# Patient Record
Sex: MALE | Race: WHITE | NOT HISPANIC OR LATINO | Employment: FULL TIME | RURAL
[De-identification: names, ages, dates, MRNs, and addresses within clinical notes are randomized per-mention and may not be internally consistent; named-entity substitution may affect disease eponyms.]

---

## 2023-01-19 ENCOUNTER — TELEPHONE (OUTPATIENT)
Dept: FAMILY MEDICINE CLINIC | Facility: CLINIC | Age: 28
End: 2023-01-19

## 2023-01-19 NOTE — TELEPHONE ENCOUNTER
Caller: Mike Toledo    Relationship: Self    Best call back number: [phone]    Requested Prescriptions:   Requested Prescriptions      No prescriptions requested or ordered in this encounter        Pharmacy where request should be sent: [facility]/PHARMACY #[id number]  [place] [address] - [phone] [facility] [phone] FX     Additional details provided by patient: ALLOPURONOL (DID NOT KNOW MG)  PATIENT STATED THAT HE HAS NOT BEEN SEEN SINCE LAST YEAR AND WAS NEEDING MEDICATION REFILLED BUT IF HE WOULD NEED TO BE SEEN PLEASE LET HIM ASAP    Does the patient have less than a 3 day supply:  [x] Yes  [] No    Would you like a call back once the refill request has been completed: [x] Yes [] No    If the office needs to give you a call back, can they leave a voicemail: [x] Yes [] No    Akanksha French Rep   01/19/23 15:02 EST

## 2023-01-20 RX ORDER — ALLOPURINOL 300 MG/1
300 TABLET ORAL DAILY
COMMUNITY
End: 2023-01-20 | Stop reason: SDUPTHER

## 2023-01-20 RX ORDER — ALLOPURINOL 300 MG/1
300 TABLET ORAL DAILY
Qty: 30 TABLET | Refills: 0 | Status: SHIPPED | OUTPATIENT
Start: 2023-01-20 | End: 2023-01-25 | Stop reason: SDUPTHER

## 2023-01-20 NOTE — TELEPHONE ENCOUNTER
Sending one month, please schedule physical with Jesus. Also please update his demographic info, I got his address from [facility]. May not be correct.

## 2023-01-24 ENCOUNTER — OFFICE VISIT (OUTPATIENT)
Dept: FAMILY MEDICINE CLINIC | Facility: CLINIC | Age: 28
End: 2023-01-24
Payer: COMMERCIAL

## 2023-01-24 VITALS
RESPIRATION RATE: 17 BRPM | HEART RATE: 98 BPM | BODY MASS INDEX: 33.23 KG/M2 | SYSTOLIC BLOOD PRESSURE: 140 MMHG | HEIGHT: 72 IN | WEIGHT: 245.38 LBS | OXYGEN SATURATION: 98 % | DIASTOLIC BLOOD PRESSURE: 84 MMHG

## 2023-01-24 DIAGNOSIS — M1A.0790 CHRONIC IDIOPATHIC GOUT INVOLVING TOE WITHOUT TOPHUS, UNSPECIFIED LATERALITY: ICD-10-CM

## 2023-01-24 DIAGNOSIS — Z00.00 ANNUAL PHYSICAL EXAM: Primary | ICD-10-CM

## 2023-01-24 DIAGNOSIS — R04.0 EPISTAXIS: ICD-10-CM

## 2023-01-24 DIAGNOSIS — J30.89 NON-SEASONAL ALLERGIC RHINITIS DUE TO OTHER ALLERGIC TRIGGER: ICD-10-CM

## 2023-01-24 PROCEDURE — 99385 PREV VISIT NEW AGE 18-39: CPT | Performed by: PHYSICIAN ASSISTANT

## 2023-01-24 PROCEDURE — 36415 COLL VENOUS BLD VENIPUNCTURE: CPT | Performed by: PHYSICIAN ASSISTANT

## 2023-01-24 NOTE — PROGRESS NOTES
.  Chief Complaint  Med Refill and Establish Care (Re establish care and medication refills. )    Subjective          Mike Toledo presents to [facility] for   History of Present Illness  Patient was last seen in our office over a year ago.  He is here to reestablish care.  He states that he has a history of gout and was placed on allopurinol a few years ago.  He states that he has been taking allopurinol daily and has had no recurrence of gout.  He states that he been taking it daily until he ran out about a week ago.  He states that he has not had any symptoms of any joint pain or redness since has been out of his medication.  He states that he has had chronic nosebleeds since he was born.  He states that they are the type that will regularly start and drenched shirt.  He states that he was seen by an ENT as a child and was told that he was too young to have cauterization.  He did start using Flonase daily and that seems to help put off his nosebleeds.  He states that he has them not as often but they do seem more severe when he does have them.  He states that he was having a nosebleed about every 2 days but then he started the Flonase and has them at most twice a month.  He states he has never had to go to the ER for a nosebleed and they usually stop within 2 minutes.  Patient states that he is always had seasonal allergies he usually has sneezing runny nose and congestion.  He states that he has constant postnasal drainage.  He states that he has eyedrops but takes Xyzal only with symptoms.  He has not had a flu shot this year      History of gout- no issues since starting allupurinol. He has been taking daily until ran out. A week ago at most two, no symptoms since out, He lalit any other concerns he has had chronic nose beeds since her was born., he has the occ drenching his short.  He was seen by ent as kiddo. Told too young to have cauterization, he started nose spray dfily and  "helps put it off a bit so not as often but more severe. Were every two days and since on nose spray twice a month. If he can hold nose last about two minutes. He staes if at work just workd through it,  No ER visits. Taking only one gout attack      Objective   Vital Signs:   Vitals:    01/24/23 0834   BP: 140/84   BP Location: Left arm   Patient Position: Sitting   Cuff Size: Adult   Pulse: 98   Resp: 17   SpO2: 98%   Weight: 111 kg (245 lb 6 oz)   Height: 182.9 cm (72\")   PainSc: 0-No pain     Body mass index is 33.28 kg/m².    BMI is >= 30 and <35. (Class 1 Obesity). The following options were offered after discussion;: exercise counseling/recommendations and nutrition counseling/recommendations    Review of Systems   HENT: Positive for nosebleeds, postnasal drip, rhinorrhea, sinus pressure and sneezing.    Eyes: Positive for itching.   Respiratory: Negative for cough, shortness of breath and wheezing.    Gastrointestinal: Negative for blood in stool, constipation and nausea.   Genitourinary: Negative for difficulty urinating and dysuria.   Neurological: Positive for dizziness. Negative for light-headedness.           Physical Exam  Vitals and nursing note reviewed.   Constitutional:       Appearance: Normal appearance.   HENT:      Head: Normocephalic and atraumatic.      Right Ear: Tympanic membrane, ear canal and external ear normal.      Left Ear: Tympanic membrane, ear canal and external ear normal.      Nose: Congestion present.      Comments: Mucosa eryth  Eyes:      Pupils: Pupils are equal, round, and reactive to light.   Cardiovascular:      Rate and Rhythm: Normal rate and regular rhythm.      Heart sounds: Normal heart sounds.   Pulmonary:      Effort: Pulmonary effort is normal.      Breath sounds: Normal breath sounds.   Neurological:      General: No focal deficit present.      Mental Status: He is alert.   Psychiatric:         Mood and Affect: Mood normal.        Result Review :            "     Immunization History   Administered Date(s) Administered   • COVID-19 (PFIZER) PURPLE CAP 04/07/2021, 04/30/2021, 01/13/2022   • DTaP, Unspecified 08/24/2001   • Hep B, Adolescent or Pediatric 08/24/2001   • IPV 08/24/2001   • Influenza, Unspecified 10/16/2020   • MMR 08/24/2001   • Varicella 08/24/2001       Health Maintenance   Topic Date Due   • INFLUENZA VACCINE  03/31/2023 (Originally 8/1/2022)   • TDAP/TD VACCINES (1 - Tdap) 01/24/2024 (Originally 4/11/2016)            Assessment and Plan    Diagnoses and all orders for this visit:    1. Annual physical exam (Primary)   Labs today  -     CBC w AUTO Differential  -     Comprehensive metabolic panel  -     Lipid panel    2. Epistaxis  -     Ambulatory Referral to ENT (Otolaryngology)  Will refer to ENT and in the meantime to continue Flonase daily  3. Non-seasonal allergic rhinitis due to other allergic trigger  Ask that he start taking his antihistamines daily  4. Chronic idiopathic gout involving toe without tophus, unspecified laterality  Uric acid level for today and will restart his allopurinol as well.  -     Uric acid  -     allopurinol (ZYLOPRIM) 300 MG tablet; Take 1 tablet by mouth Daily.  Dispense: 90 tablet; Refill: 1        Counseling/anticipatory guidance: Nutrition, physical activity, healthy weight, dental health, mental health, eye exam, immunizations, screenings      Follow Up   Return in about 6 months (around 7/24/2023).  Patient was given instructions and counseling regarding his condition or for health maintenance advice. Please see specific information pulled into the AVS if appropriate.

## 2023-01-25 LAB
ALBUMIN SERPL-MCNC: 5.1 G/DL (ref 4.1–5.2)
ALBUMIN/GLOB SERPL: 2.4 {RATIO} (ref 1.2–2.2)
ALP SERPL-CCNC: 55 IU/L (ref 44–121)
ALT SERPL-CCNC: 43 IU/L (ref 0–44)
AST SERPL-CCNC: 27 IU/L (ref 0–40)
BASOPHILS # BLD AUTO: 0 X10E3/UL (ref 0–0.2)
BASOPHILS NFR BLD AUTO: 1 %
BILIRUB SERPL-MCNC: 0.5 MG/DL (ref 0–1.2)
BUN SERPL-MCNC: 8 MG/DL (ref 6–20)
BUN/CREAT SERPL: 9 (ref 9–20)
CALCIUM SERPL-MCNC: 9.7 MG/DL (ref 8.7–10.2)
CHLORIDE SERPL-SCNC: 104 MMOL/L (ref 96–106)
CHOLEST SERPL-MCNC: 166 MG/DL (ref 100–199)
CO2 SERPL-SCNC: 24 MMOL/L (ref 20–29)
CREAT SERPL-MCNC: 0.92 MG/DL (ref 0.76–1.27)
EGFRCR SERPLBLD CKD-EPI 2021: 118 ML/MIN/1.73
EOSINOPHIL # BLD AUTO: 0.2 X10E3/UL (ref 0–0.4)
EOSINOPHIL NFR BLD AUTO: 3 %
ERYTHROCYTE [DISTWIDTH] IN BLOOD BY AUTOMATED COUNT: 11.9 % (ref 11.6–15.4)
GLOBULIN SER CALC-MCNC: 2.1 G/DL (ref 1.5–4.5)
GLUCOSE SERPL-MCNC: 87 MG/DL (ref 70–99)
HCT VFR BLD AUTO: 49.4 % (ref 37.5–51)
HDLC SERPL-MCNC: 53 MG/DL
HGB BLD-MCNC: 16.6 G/DL (ref 13–17.7)
IMM GRANULOCYTES # BLD AUTO: 0 X10E3/UL (ref 0–0.1)
IMM GRANULOCYTES NFR BLD AUTO: 0 %
LDLC SERPL CALC-MCNC: 103 MG/DL (ref 0–99)
LYMPHOCYTES # BLD AUTO: 1.9 X10E3/UL (ref 0.7–3.1)
LYMPHOCYTES NFR BLD AUTO: 33 %
MCH RBC QN AUTO: 29.6 PG (ref 26.6–33)
MCHC RBC AUTO-ENTMCNC: 33.6 G/DL (ref 31.5–35.7)
MCV RBC AUTO: 88 FL (ref 79–97)
MONOCYTES # BLD AUTO: 0.5 X10E3/UL (ref 0.1–0.9)
MONOCYTES NFR BLD AUTO: 9 %
NEUTROPHILS # BLD AUTO: 3.1 X10E3/UL (ref 1.4–7)
NEUTROPHILS NFR BLD AUTO: 54 %
PLATELET # BLD AUTO: 308 X10E3/UL (ref 150–450)
POTASSIUM SERPL-SCNC: 5 MMOL/L (ref 3.5–5.2)
PROT SERPL-MCNC: 7.2 G/DL (ref 6–8.5)
RBC # BLD AUTO: 5.6 X10E6/UL (ref 4.14–5.8)
SODIUM SERPL-SCNC: 141 MMOL/L (ref 134–144)
TRIGL SERPL-MCNC: 51 MG/DL (ref 0–149)
URATE SERPL-MCNC: 7.7 MG/DL (ref 3.8–8.4)
VLDLC SERPL CALC-MCNC: 10 MG/DL (ref 5–40)
WBC # BLD AUTO: 5.7 X10E3/UL (ref 3.4–10.8)

## 2023-01-25 RX ORDER — ALLOPURINOL 300 MG/1
300 TABLET ORAL DAILY
Qty: 90 TABLET | Refills: 1 | Status: SHIPPED | OUTPATIENT
Start: 2023-01-25

## 2023-01-25 NOTE — PROGRESS NOTES
Please call patient and let him know that his uric acid level is on the high end of normal but good- will recheck at his next visit. His cholesterol, blood count and electrolytes all look good

## 2023-07-28 DIAGNOSIS — M1A.0790 CHRONIC IDIOPATHIC GOUT INVOLVING TOE WITHOUT TOPHUS, UNSPECIFIED LATERALITY: ICD-10-CM

## 2023-07-28 RX ORDER — ALLOPURINOL 300 MG/1
TABLET ORAL
Qty: 90 TABLET | Refills: 1 | Status: SHIPPED | OUTPATIENT
Start: 2023-07-28

## 2024-02-15 DIAGNOSIS — M1A.0790 CHRONIC IDIOPATHIC GOUT INVOLVING TOE WITHOUT TOPHUS, UNSPECIFIED LATERALITY: ICD-10-CM

## 2024-02-15 RX ORDER — ALLOPURINOL 300 MG/1
TABLET ORAL
Qty: 30 TABLET | Refills: 0 | Status: SHIPPED | OUTPATIENT
Start: 2024-02-15

## 2024-02-27 ENCOUNTER — OFFICE VISIT (OUTPATIENT)
Dept: FAMILY MEDICINE CLINIC | Facility: CLINIC | Age: 29
End: 2024-02-27
Payer: COMMERCIAL

## 2024-02-27 VITALS
HEART RATE: 92 BPM | SYSTOLIC BLOOD PRESSURE: 124 MMHG | WEIGHT: 225 LBS | OXYGEN SATURATION: 98 % | BODY MASS INDEX: 30.48 KG/M2 | DIASTOLIC BLOOD PRESSURE: 80 MMHG | HEIGHT: 72 IN

## 2024-02-27 DIAGNOSIS — R07.89 COSTOCHONDRAL CHEST PAIN: ICD-10-CM

## 2024-02-27 DIAGNOSIS — M1A.0790 CHRONIC IDIOPATHIC GOUT INVOLVING TOE WITHOUT TOPHUS, UNSPECIFIED LATERALITY: ICD-10-CM

## 2024-02-27 DIAGNOSIS — Z00.00 ANNUAL PHYSICAL EXAM: Primary | ICD-10-CM

## 2024-02-27 RX ORDER — ALLOPURINOL 300 MG/1
300 TABLET ORAL DAILY
Qty: 90 TABLET | Refills: 0 | Status: SHIPPED | OUTPATIENT
Start: 2024-02-27

## 2024-02-27 NOTE — PROGRESS NOTES
"Chief Complaint  Annual Exam and Gout (Med recheck )    Subjective          Mike Toledo presents to [facility] for   History of Present Illness  Patient presents to clinic for his annual exam.  He states that with increased activity he has found he is lost about 20 pounds since last time he checked it which was about a year ago.  Patient states that he has continued to take his allopurinol until he ran out a few days ago.  He states he has not had any gout attacks at all since his last visit.  He states that before he was on the allopurinol he had them once in a blue moon but certainly more regularly.  He states that every once in a while he will feel a pop in the middle of his chest which she notices gets worse when he is doing a lot of physical exertion at work.  He denies any trauma to the area or falls.  He states that happens very rarely.    Objective   Vital Signs:   Vitals:    02/27/24 0907   BP: 124/80   Pulse: 92   SpO2: 98%   Weight: 102 kg (225 lb)   Height: 182.9 cm (72\")     Body mass index is 30.52 kg/m².  BMI is >= 30 and <35. (Class 1 Obesity). The following options were offered after discussion;: information on healthy weight added to patient's after visit summary       Family History   Problem Relation Age of Onset    Arthritis Mother     Asthma Mother     Cancer Father     Hyperlipidemia Father     Heart disease Paternal Grandfather     Arthritis Sister        Social History     Tobacco Use    Smoking status: Never    Smokeless tobacco: Never    Tobacco comments:     Tried it in high school. Didnt smoke more than a pack ever   Substance Use Topics    Alcohol use: Yes     Alcohol/week: 2.0 standard drinks of alcohol     Types: 2 Cans of beer per week       Past Surgical History:   Procedure Laterality Date    TONSILLECTOMY         Review of Systems      Physical Exam  Vitals and nursing note reviewed.   Constitutional:       General: He is not in acute distress.     " Appearance: Normal appearance. He is not ill-appearing, toxic-appearing or diaphoretic.   HENT:      Head: Normocephalic and atraumatic.      Right Ear: Tympanic membrane, ear canal and external ear normal.      Left Ear: Tympanic membrane, ear canal and external ear normal.      Nose: Nose normal.      Mouth/Throat:      Mouth: Mucous membranes are moist.   Eyes:      Pupils: Pupils are equal, round, and reactive to light.   Cardiovascular:      Rate and Rhythm: Normal rate and regular rhythm.      Heart sounds: Normal heart sounds.   Pulmonary:      Effort: Pulmonary effort is normal.      Breath sounds: Normal breath sounds.   Chest:       Musculoskeletal:         General: Normal range of motion.   Neurological:      General: No focal deficit present.      Mental Status: He is alert.   Psychiatric:         Mood and Affect: Mood normal.         Behavior: Behavior normal.      Result Review :                Immunization History   Administered Date(s) Administered    COVID-19 (PFIZER) Purple Cap Monovalent 04/07/2021, 04/30/2021, 01/13/2022    DTaP, Unspecified 08/24/2001    Fluzone (or Fluarix & Flulaval for VFC) >6mos 10/16/2020    Hep B, Adolescent or Pediatric 08/24/2001    IPV 08/24/2001    Influenza, Unspecified 10/16/2020    MMR 08/24/2001    Varicella 08/24/2001       Health Maintenance   Topic Date Due    TDAP/TD VACCINES (1 - Tdap) Never done    INFLUENZA VACCINE  03/31/2024 (Originally 8/1/2023)    COVID-19 Vaccine (4 - 2023-24 season) 12/01/2024 (Originally 9/1/2023)    ANNUAL PHYSICAL  02/27/2025    BMI FOLLOWUP  02/27/2025    HEPATITIS C SCREENING  Completed    Pneumococcal Vaccine 0-64  Aged Out              Assessment and Plan    Diagnoses and all orders for this visit:    1. Annual physical exam (Primary)  -     CBC Auto Differential  -     Comprehensive Metabolic Panel  -     Lipid Panel  -     Hepatitis C antibody  -     Uric acid    2. Chronic idiopathic gout involving toe without tophus,  "unspecified laterality  -     CBC Auto Differential  -     Comprehensive Metabolic Panel  -     Lipid Panel  -     Hepatitis C antibody  -     Uric acid  -     allopurinol (ZYLOPRIM) 300 MG tablet; Take 1 tablet by mouth Daily.  Dispense: 90 tablet; Refill: 0  Labs and refill medication  3. Costochondral chest pain  Reassurance to patient that he is feeling the \"joint\" between fib and breastbone move back and forth- If it becomes painful or occure more often he is to let us know        Counseling/anticipatory guidance: Nutrition, physical activity, healthy weight, dental health, mental health, eye exam, immunizations, screenings      Follow Up   No follow-ups on file.  Patient was given instructions and counseling regarding his condition or for health maintenance advice. Please see specific information pulled into the AVS if appropriate.        "

## 2024-02-28 LAB
ALBUMIN SERPL-MCNC: 5 G/DL (ref 4.3–5.2)
ALBUMIN/GLOB SERPL: 2.3 {RATIO} (ref 1.2–2.2)
ALP SERPL-CCNC: 61 IU/L (ref 44–121)
ALT SERPL-CCNC: 32 IU/L (ref 0–44)
AST SERPL-CCNC: 23 IU/L (ref 0–40)
BASOPHILS # BLD AUTO: 0 X10E3/UL (ref 0–0.2)
BASOPHILS NFR BLD AUTO: 1 %
BILIRUB SERPL-MCNC: 1.1 MG/DL (ref 0–1.2)
BUN SERPL-MCNC: 11 MG/DL (ref 6–20)
BUN/CREAT SERPL: 12 (ref 9–20)
CALCIUM SERPL-MCNC: 9.9 MG/DL (ref 8.7–10.2)
CHLORIDE SERPL-SCNC: 103 MMOL/L (ref 96–106)
CHOLEST SERPL-MCNC: 171 MG/DL (ref 100–199)
CO2 SERPL-SCNC: 26 MMOL/L (ref 20–29)
CREAT SERPL-MCNC: 0.89 MG/DL (ref 0.76–1.27)
EGFRCR SERPLBLD CKD-EPI 2021: 121 ML/MIN/1.73
EOSINOPHIL # BLD AUTO: 0.1 X10E3/UL (ref 0–0.4)
EOSINOPHIL NFR BLD AUTO: 3 %
ERYTHROCYTE [DISTWIDTH] IN BLOOD BY AUTOMATED COUNT: 12 % (ref 11.6–15.4)
GLOBULIN SER CALC-MCNC: 2.2 G/DL (ref 1.5–4.5)
GLUCOSE SERPL-MCNC: 92 MG/DL (ref 70–99)
HCT VFR BLD AUTO: 49.6 % (ref 37.5–51)
HCV IGG SERPL QL IA: NON REACTIVE
HDLC SERPL-MCNC: 48 MG/DL
HGB BLD-MCNC: 16.3 G/DL (ref 13–17.7)
IMM GRANULOCYTES # BLD AUTO: 0 X10E3/UL (ref 0–0.1)
IMM GRANULOCYTES NFR BLD AUTO: 0 %
LDLC SERPL CALC-MCNC: 107 MG/DL (ref 0–99)
LYMPHOCYTES # BLD AUTO: 1.8 X10E3/UL (ref 0.7–3.1)
LYMPHOCYTES NFR BLD AUTO: 35 %
MCH RBC QN AUTO: 29.2 PG (ref 26.6–33)
MCHC RBC AUTO-ENTMCNC: 32.9 G/DL (ref 31.5–35.7)
MCV RBC AUTO: 89 FL (ref 79–97)
MONOCYTES # BLD AUTO: 0.4 X10E3/UL (ref 0.1–0.9)
MONOCYTES NFR BLD AUTO: 7 %
NEUTROPHILS # BLD AUTO: 2.9 X10E3/UL (ref 1.4–7)
NEUTROPHILS NFR BLD AUTO: 54 %
PLATELET # BLD AUTO: 290 X10E3/UL (ref 150–450)
POTASSIUM SERPL-SCNC: 4.6 MMOL/L (ref 3.5–5.2)
PROT SERPL-MCNC: 7.2 G/DL (ref 6–8.5)
RBC # BLD AUTO: 5.59 X10E6/UL (ref 4.14–5.8)
SODIUM SERPL-SCNC: 141 MMOL/L (ref 134–144)
TRIGL SERPL-MCNC: 85 MG/DL (ref 0–149)
URATE SERPL-MCNC: 6.3 MG/DL (ref 3.8–8.4)
VLDLC SERPL CALC-MCNC: 16 MG/DL (ref 5–40)
WBC # BLD AUTO: 5.3 X10E3/UL (ref 3.4–10.8)

## 2024-02-28 NOTE — PROGRESS NOTES
Please call the patient and let him know that his labs look good. His kidney, liver and electrolytes are normal. His total cholesterol is 171 which is good and his LDL cholesterol is slightly elevated at 107. His uric acid levels are at 6.3 as compared with 7 last year and also good! He does not have Hep C and his blood count is normal as well

## 2024-04-10 ENCOUNTER — OFFICE VISIT (OUTPATIENT)
Dept: FAMILY MEDICINE CLINIC | Facility: CLINIC | Age: 29
End: 2024-04-10
Payer: COMMERCIAL

## 2024-04-10 VITALS
BODY MASS INDEX: 31.13 KG/M2 | DIASTOLIC BLOOD PRESSURE: 86 MMHG | HEIGHT: 72 IN | WEIGHT: 229.8 LBS | HEART RATE: 80 BPM | OXYGEN SATURATION: 97 % | SYSTOLIC BLOOD PRESSURE: 122 MMHG

## 2024-04-10 DIAGNOSIS — M79.18 RIGHT BUTTOCK PAIN: ICD-10-CM
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
## 2024-04-10 NOTE — ASSESSMENT & PLAN NOTE
"Has had ongoing pain since the fall. Reports overall has improved, but still with severe pain if he \"slouches\" in a chair or lays on a hard surface with pressure on the right buttock/hip.     Taking no medications for pain relief outside of tylenol on the day of injury. Reports dark purple bruising for a few weeks after fall, has significantly improved but does still have faint bruising on right buttock with hard knot on upper buttock/low back/hip area    We discussed symptomatic measures. Will check xray to confirm if there are any fractures - will follow for results  "

## 2024-04-10 NOTE — PROGRESS NOTES
"    Office Note     Name: Mike Toledo    : 1995     MRN: [id number]     Chief Complaint  Fall (Pt had fell down stairs a month and a half ago and his back/Buttocks have been hurting since ) and Rectal Pain (Pt has a sharp pain along his right hand side, Pt states it hurts the most when sitting in a certain position. Its further up his back )    Subjective     History of Present Illness:  Mike Toledo is a 28 y.o. male who presents today for persistent pain in his right hip/low back/buttock after a fall down the stairs 6 weeks ago.       Objective     Past Medical History:   Diagnosis Date    Allergic 1995    Allergic to outside and dust     Past Surgical History:   Procedure Laterality Date    TONSILLECTOMY       Family History   Problem Relation Age of Onset    Arthritis Mother     Asthma Mother     Cancer Father     Hyperlipidemia Father     Heart disease Paternal Grandfather     Arthritis Sister        Vital Signs  /86 (BP Location: Left arm, Patient Position: Sitting, Cuff Size: Adult)   Pulse 80   Ht 182.9 cm (72\")   Wt 104 kg (229 lb 12.8 oz)   SpO2 97%   BMI 31.17 kg/m²   Estimated body mass index is 31.17 kg/m² as calculated from the following:    Height as of this encounter: 182.9 cm (72\").    Weight as of this encounter: 104 kg (229 lb 12.8 oz).    Physical Exam  Vitals reviewed.   Constitutional:       Appearance: Normal appearance.   Cardiovascular:      Rate and Rhythm: Normal rate and regular rhythm.      Heart sounds: Normal heart sounds.   Pulmonary:      Effort: Pulmonary effort is normal.      Breath sounds: Normal breath sounds.   Musculoskeletal:      Right hip: Normal. Tenderness present.        Legs:    Skin:     General: Skin is warm and dry.      Capillary Refill: Capillary refill takes less than 2 seconds.   Neurological:      General: No focal deficit present.      Mental Status: He is alert and oriented to person, place, and time.   Psychiatric:         Mood and " "Affect: Mood normal.         Behavior: Behavior normal.               Assessment and Plan     Diagnoses and all orders for this visit:    1. Fall down stairs, initial encounter (Primary)  Assessment & Plan:  Fell down stairs at home about 6 weeks ago, was after his last visit in-office. Slipped, wearing flip flops, landed on his R buttock and bounced down the last 2 steps. Reports immediate pain, became nauseous and broke out in a sweat from the pain but not vomit.     Was not and has not been seen at [facility] or ER for this injury    Orders:  -     Cancel: XR Spine Lumbar 2 or 3 View (In Office)  -     XR Sacrum & Coccyx    2. Right buttock pain  Assessment & Plan:  Has had ongoing pain since the fall. Reports overall has improved, but still with severe pain if he \"slouches\" in a chair or lays on a hard surface with pressure on the right buttock/hip.     Taking no medications for pain relief outside of tylenol on the day of injury. Reports dark purple bruising for a few weeks after fall, has significantly improved but does still have faint bruising on right buttock with hard knot on upper buttock/low back/hip area    We discussed symptomatic measures. Will check xray to confirm if there are any fractures - will follow for results    Orders:  -     Cancel: XR Spine Lumbar 2 or 3 View (In Office)  -     XR Sacrum & Coccyx       Follow Up  Return if symptoms worsen or fail to improve.    MYRA Nguyen  "

## 2024-04-10 NOTE — ASSESSMENT & PLAN NOTE
Fell down stairs at home about 6 weeks ago, was after his last visit in-office. Slipped, wearing flip flops, landed on his R buttock and bounced down the last 2 steps. Reports immediate pain, became nauseous and broke out in a sweat from the pain but not vomit.     Was not and has not been seen at [facility] or ER for this injury

## 2024-05-27 DIAGNOSIS — M1A.0790 CHRONIC IDIOPATHIC GOUT INVOLVING TOE WITHOUT TOPHUS, UNSPECIFIED LATERALITY: ICD-10-CM

## 2024-05-28 RX ORDER — ALLOPURINOL 300 MG/1
300 TABLET ORAL DAILY
Qty: 90 TABLET | Refills: 0 | Status: SHIPPED | OUTPATIENT
Start: 2024-05-28

## 2024-09-04 DIAGNOSIS — M1A.0790 CHRONIC IDIOPATHIC GOUT INVOLVING TOE WITHOUT TOPHUS, UNSPECIFIED LATERALITY: ICD-10-CM

## 2024-09-04 RX ORDER — ALLOPURINOL 300 MG/1
300 TABLET ORAL DAILY
Qty: 90 TABLET | Refills: 0 | Status: SHIPPED | OUTPATIENT
Start: 2024-09-04

## 2024-12-12 DIAGNOSIS — M1A.0790 CHRONIC IDIOPATHIC GOUT INVOLVING TOE WITHOUT TOPHUS, UNSPECIFIED LATERALITY: ICD-10-CM

## 2024-12-12 RX ORDER — ALLOPURINOL 300 MG/1
300 TABLET ORAL DAILY
Qty: 90 TABLET | Refills: 0 | Status: SHIPPED | OUTPATIENT
Start: 2024-12-12

## 2025-03-10 DIAGNOSIS — M1A.0790 CHRONIC IDIOPATHIC GOUT INVOLVING TOE WITHOUT TOPHUS, UNSPECIFIED LATERALITY: ICD-10-CM

## 2025-03-10 RX ORDER — ALLOPURINOL 300 MG/1
300 TABLET ORAL DAILY
Qty: 30 TABLET | Refills: 0 | Status: SHIPPED | OUTPATIENT
Start: 2025-03-10

## 2025-06-23 ENCOUNTER — OFFICE VISIT (OUTPATIENT)
Dept: FAMILY MEDICINE CLINIC | Facility: CLINIC | Age: 30
End: 2025-06-23
Payer: COMMERCIAL

## 2025-06-23 VITALS
HEIGHT: 72 IN | WEIGHT: 241 LBS | OXYGEN SATURATION: 97 % | DIASTOLIC BLOOD PRESSURE: 74 MMHG | BODY MASS INDEX: 32.64 KG/M2 | HEART RATE: 98 BPM | SYSTOLIC BLOOD PRESSURE: 116 MMHG

## 2025-06-23 DIAGNOSIS — Z13.21 ENCOUNTER FOR VITAMIN DEFICIENCY SCREENING: ICD-10-CM

## 2025-06-23 DIAGNOSIS — Z13.220 SCREENING FOR LIPID DISORDERS: ICD-10-CM

## 2025-06-23 DIAGNOSIS — Z00.00 ANNUAL PHYSICAL EXAM: Primary | ICD-10-CM

## 2025-06-23 DIAGNOSIS — M25.541 ARTHRALGIA OF BOTH HANDS: ICD-10-CM

## 2025-06-23 DIAGNOSIS — M1A.0790 CHRONIC IDIOPATHIC GOUT INVOLVING TOE WITHOUT TOPHUS, UNSPECIFIED LATERALITY: ICD-10-CM

## 2025-06-23 DIAGNOSIS — M25.542 ARTHRALGIA OF BOTH HANDS: ICD-10-CM

## 2025-06-23 DIAGNOSIS — J30.1 SEASONAL ALLERGIC RHINITIS DUE TO POLLEN: ICD-10-CM

## 2025-06-23 DIAGNOSIS — Z13.29 SCREENING FOR THYROID DISORDER: ICD-10-CM

## 2025-06-23 PROCEDURE — 99214 OFFICE O/P EST MOD 30 MIN: CPT | Performed by: PHYSICIAN ASSISTANT

## 2025-06-23 PROCEDURE — 99395 PREV VISIT EST AGE 18-39: CPT | Performed by: PHYSICIAN ASSISTANT

## 2025-06-23 RX ORDER — AZELASTINE 1 MG/ML
2 SPRAY, METERED NASAL 2 TIMES DAILY
Qty: 30 ML | Refills: 3 | Status: SHIPPED | OUTPATIENT
Start: 2025-06-23

## 2025-06-23 RX ORDER — ALLOPURINOL 300 MG/1
300 TABLET ORAL DAILY
Qty: 90 TABLET | Refills: 1 | Status: SHIPPED | OUTPATIENT
Start: 2025-06-23

## 2025-06-23 NOTE — PROGRESS NOTES
Chief Complaint  Gout (Med recheck )    Subjective          Mike Toledo presents to [facility] for   History of Present Illness  .  History of Present Illness  The patient is here today to recheck his gout.    He reports that his gout has been well-managed with allopurinol, and he has not experienced any recent flare-ups. However, he has been off the medication for the past 3 weeks due to forgetfulness in scheduling an appointment. He is seeking a refill of his allopurinol prescription.    He also reports intermittent bilateral hand discomfort, which varies in intensity from day to day. 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 Despite the discomfort, he retains his strength but notes an unusual sensation in his hands. The pain is predominantly localized to his knuckles. He occasionally experiences numbness and tingling in his hands, and wrist pain that lasted for 4 days about a week ago. He also reports difficulty moving his hands upon waking up on some mornings. He has been managing the pain with acetaminophen 400 mg, which provides minimal relief. He has a history of a broken wrist from his youth.    He has been experiencing seasonal allergies, which have necessitated nasal cauterization on several occasions due to severe nosebleeds. He took an allergy medication last night, which provided significant relief. He reports no history of asthma. His allergy symptoms include a runny nose, coughing up mucus, sniffling, and an itchy nose and throat. He has previously tried Benadryl and Xyzal, both of which initially provided relief but eventually became ineffective. He underwent allergy testing approximately 20 years ago and received allergy injections for several years, which also lost their effectiveness over time.    FAMILY HISTORY  His mother and sister have rheumatoid  "arthritis.      Objective   Vital Signs:   Vitals:    06/23/25 1302   BP: 116/74   Pulse: 98   SpO2: 97%   Weight: 109 kg (241 lb)   Height: 182.9 cm (72\")     Body mass index is 32.69 kg/m².        Family History   Problem Relation Age of Onset   • Arthritis Mother    • Asthma Mother    • Cancer Father    • Hyperlipidemia Father    • Heart disease Paternal Grandfather    • Arthritis Sister        Social History     Tobacco Use   • Smoking status: Never   • Smokeless tobacco: Never   • Tobacco comments:     Tried it in high school. Didnt smoke more than a pack ever   Substance Use Topics   • Alcohol use: Yes     Alcohol/week: 2.0 standard drinks of alcohol     Types: 2 Cans of beer per week       Past Surgical History:   Procedure Laterality Date   • TONSILLECTOMY         Review of Systems      Physical Exam  Vitals and nursing note reviewed.   Constitutional:       General: He is not in acute distress.     Appearance: Normal appearance. He is not ill-appearing, toxic-appearing or diaphoretic.   HENT:      Head: Normocephalic and atraumatic.      Right Ear: Tympanic membrane, ear canal and external ear normal.      Left Ear: Tympanic membrane, ear canal and external ear normal.      Nose: Congestion present.      Mouth/Throat:      Mouth: Mucous membranes are moist.   Eyes:      Pupils: Pupils are equal, round, and reactive to light.   Cardiovascular:      Rate and Rhythm: Normal rate and regular rhythm.   Pulmonary:      Effort: Pulmonary effort is normal.      Breath sounds: Normal breath sounds.   Neurological:      General: No focal deficit present.      Mental Status: He is alert.   Psychiatric:         Mood and Affect: Mood normal.         Behavior: Behavior normal.      Result Review :                Immunization History   Administered Date(s) Administered   • COVID-19 (PFIZER) Purple Cap Monovalent 04/07/2021, 04/30/2021, 01/13/2022   • DTaP, Unspecified 08/24/2001   • Fluzone (or Fluarix & Flulaval for VFC) " >6mos 10/16/2020   • Hep B, Adolescent or Pediatric 08/24/2001   • IPV 08/24/2001   • Influenza, Unspecified 10/16/2020   • MMR 08/24/2001   • Varicella 08/24/2001       Health Maintenance   Topic Date Due   • TDAP/TD VACCINES (1 - Tdap) Never done   • ANNUAL PHYSICAL  02/27/2025   • COVID-19 Vaccine (4 - 2024-25 season) 01/01/2026 (Originally 9/1/2024)   • INFLUENZA VACCINE  07/01/2025   • HEPATITIS C SCREENING  Completed   • Pneumococcal Vaccine 0-49  Aged Out              Assessment and Plan    Diagnoses and all orders for this visit:    1. Annual physical exam (Primary)  -     CBC & Differential  -     Comprehensive Metabolic Panel  -     Lipid Panel  -     TSH  -     T4, free  -     Vitamin B12  -     Vitamin D,25-Hydroxy    2. Chronic idiopathic gout involving toe without tophus, unspecified laterality  -     allopurinol (ZYLOPRIM) 300 MG tablet; Take 1 tablet by mouth Daily.  Dispense: 90 tablet; Refill: 1  - Reports no current gout symptoms and has been off allopurinol for 3 weeks.  - No significant issues with gout currently.  - Discussed re-prescribing allopurinol.  - Prescription for allopurinol will be sent to pharmacy.    3. Arthralgia of both hands  -     Uric acid  -     Rheumatoid Factor  -     C-reactive protein  -     FLORES  Experiences hand pain, particularly in the knuckles, which worsens after work and sometimes in the morning.  - No significant relief from acetaminophen.  - Differential diagnosis includes osteoarthritis and rheumatoid arthritis; laboratory tests will be conducted to rule out rheumatoid arthritis.  - Advised to start ibuprofen or Aleve for anti-inflammatory effects.    4. Seasonal allergic rhinitis due to pollen  -     azelastine (ASTELIN) 0.1 % nasal spray; Administer 2 sprays into the nostril(s) as directed by provider 2 (Two) Times a Day. Use in each nostril as directed  Dispense: 30 mL; Refill: 3  - Reports symptoms of runny nose, coughing up mucus, and itchy throat.  -  Physical exam reveals no signs of sinus infection; lungs sound clear.  - Discussed use of azelastine nasal spray and starting a daily 24-hour antihistamine.  - Prescription for azelastine nasal spray will be sent to pharmacy; referral to an allergist will be considered if symptoms persist.    5. Screening for thyroid disorder  -     TSH  -     T4, free    6. Screening for lipid disorders  -     Lipid Panel    7. Encounter for vitamin deficiency screening  -     Vitamin B12  -     Vitamin D,25-Hydroxy    ..  Assessment & Plan    - Prescription for azelastine nasal spray will be sent to pharmacy; referral to an allergist will be considered if symptoms persist.        Counseling/anticipatory guidance: Nutrition, physical activity, healthy weight, dental health, mental health, eye exam, immunizations, screenings      Follow Up   No follow-ups on file.  Patient was given instructions and counseling regarding his condition or for health maintenance advice. Please see specific information pulled into the AVS if appropriate.

## 2025-06-24 ENCOUNTER — RESULTS FOLLOW-UP (OUTPATIENT)
Dept: FAMILY MEDICINE CLINIC | Facility: CLINIC | Age: 30
End: 2025-06-24
Payer: COMMERCIAL

## 2025-06-24 LAB
25(OH)D3+25(OH)D2 SERPL-MCNC: 29.4 NG/ML (ref 30–100)
ALBUMIN SERPL-MCNC: 4.8 G/DL (ref 4.3–5.2)
ALP SERPL-CCNC: 55 IU/L (ref 44–121)
ALT SERPL-CCNC: 27 IU/L (ref 0–44)
ANA SER QL: NEGATIVE
AST SERPL-CCNC: 17 IU/L (ref 0–40)
BASOPHILS # BLD AUTO: 0 X10E3/UL (ref 0–0.2)
BASOPHILS NFR BLD AUTO: 1 %
BILIRUB SERPL-MCNC: 0.8 MG/DL (ref 0–1.2)
BUN SERPL-MCNC: 9 MG/DL (ref 6–20)
BUN/CREAT SERPL: 10 (ref 9–20)
CALCIUM SERPL-MCNC: 9.5 MG/DL (ref 8.7–10.2)
CHLORIDE SERPL-SCNC: 102 MMOL/L (ref 96–106)
CHOLEST SERPL-MCNC: 153 MG/DL (ref 100–199)
CO2 SERPL-SCNC: 23 MMOL/L (ref 20–29)
CREAT SERPL-MCNC: 0.93 MG/DL (ref 0.76–1.27)
CRP SERPL-MCNC: 1 MG/L (ref 0–10)
EGFRCR SERPLBLD CKD-EPI 2021: 115 ML/MIN/1.73
EOSINOPHIL # BLD AUTO: 0.2 X10E3/UL (ref 0–0.4)
EOSINOPHIL NFR BLD AUTO: 4 %
ERYTHROCYTE [DISTWIDTH] IN BLOOD BY AUTOMATED COUNT: 12.5 % (ref 11.6–15.4)
GLOBULIN SER CALC-MCNC: 1.9 G/DL (ref 1.5–4.5)
GLUCOSE SERPL-MCNC: 83 MG/DL (ref 70–99)
HCT VFR BLD AUTO: 50.8 % (ref 37.5–51)
HDLC SERPL-MCNC: 46 MG/DL
HGB BLD-MCNC: 16 G/DL (ref 13–17.7)
IMM GRANULOCYTES # BLD AUTO: 0 X10E3/UL (ref 0–0.1)
IMM GRANULOCYTES NFR BLD AUTO: 0 %
LDLC SERPL CALC-MCNC: 90 MG/DL (ref 0–99)
LYMPHOCYTES # BLD AUTO: 2 X10E3/UL (ref 0.7–3.1)
LYMPHOCYTES NFR BLD AUTO: 31 %
MCH RBC QN AUTO: 29.7 PG (ref 26.6–33)
MCHC RBC AUTO-ENTMCNC: 31.5 G/DL (ref 31.5–35.7)
MCV RBC AUTO: 94 FL (ref 79–97)
MONOCYTES # BLD AUTO: 0.5 X10E3/UL (ref 0.1–0.9)
MONOCYTES NFR BLD AUTO: 7 %
NEUTROPHILS # BLD AUTO: 3.6 X10E3/UL (ref 1.4–7)
NEUTROPHILS NFR BLD AUTO: 57 %
PLATELET # BLD AUTO: 281 X10E3/UL (ref 150–450)
POTASSIUM SERPL-SCNC: 4.3 MMOL/L (ref 3.5–5.2)
PROT SERPL-MCNC: 6.7 G/DL (ref 6–8.5)
RBC # BLD AUTO: 5.38 X10E6/UL (ref 4.14–5.8)
RHEUMATOID FACT SERPL-ACNC: <10 IU/ML
SODIUM SERPL-SCNC: 142 MMOL/L (ref 134–144)
T4 FREE SERPL-MCNC: 1.22 NG/DL (ref 0.82–1.77)
TRIGL SERPL-MCNC: 90 MG/DL (ref 0–149)
TSH SERPL DL<=0.005 MIU/L-ACNC: 1.03 UIU/ML (ref 0.45–4.5)
URATE SERPL-MCNC: 9.6 MG/DL (ref 3.8–8.4)
VIT B12 SERPL-MCNC: 671 PG/ML (ref 232–1245)
VLDLC SERPL CALC-MCNC: 17 MG/DL (ref 5–40)
WBC # BLD AUTO: 6.2 X10E3/UL (ref 3.4–10.8)

## 2025-06-24 NOTE — TELEPHONE ENCOUNTER
Name: Mike Toledo      Relationship: Self      HUB PROVIDED THE RELAY MESSAGE FROM THE OFFICE      PATIENT: VOICED UNDERSTANDING AND HAS NO FURTHER QUESTIONS AT THIS TIME    ADDITIONAL INFORMATION:

## 2025-06-24 NOTE — TELEPHONE ENCOUNTER
SUMIT on  to call back.    HUB to relay message from [place]     Please call the patient and let her know that his labs show that he is a little bit low on vitamin D. Would recommend that he takes a multivitamin that contains vitamin D on a daily basis. His uric acid level is increased to 9.6 which is causing some of his joint pain but as he takes the allopurinol that should be going down over time. His blood count is completely normal. His kidney and liver function as well as his electrolytes are also normal. His cholesterol panel also looks great his total cholesterol is at 153 his triglycerides are at 90 and his LDL is also at 90. Please keep up the good work is thyroid function is normal. His vitamin B12 is normal. All of his tests for rheumatoid arthritis are all normal. His rheumatoid factor is negative his C-reactive protein is 1 and his FLORES is negative as well. Lets see how his joints feel once we lower the amount of uric acid in his system otherwise we may be dealing with juvenile arthritis and can keep an eye on that as well. In the next 2 weeks if things would improve please give me a call and we will work on other strategies.